# Patient Record
(demographics unavailable — no encounter records)

---

## 2025-06-03 NOTE — HISTORY OF PRESENT ILLNESS
[FreeTextEntry1] : SHARATH is a 14 year old F who presents for evaluation of BL knee pain x 1 month.  She comes in today with her mom.  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  She was doing well recently until she started developing bilateral knee pain over the last month.  She reports the left is worse than the right.  She feels that there is occasional swelling in the front of the knee.  She has pain with walking and jumping.  She has tried ice but has not taken any over-the-counter anti-inflammatories.  She currently exercises at the gym and does leg presses which she reports does not exacerbate the pain.  She is here for orthopaedic evaluation.

## 2025-06-03 NOTE — REASON FOR VISIT
[Follow Up] : a follow up visit [FreeTextEntry1] : BL knee pain [Patient] : patient [Mother] : mother

## 2025-06-03 NOTE — ASSESSMENT
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   Today's visit included obtaining the history from the child and parent, due to the child's age, the child could not be considered a reliable historian, requiring the parent to act as an independent historian. The condition, natural history, and prognosis were explained to the patient and family. The clinical findings and images were reviewed with the family.   BL Knee XRs taken in office on 6/3/25 were viewed and independently interpreted: The patient is skeletally mature. No fracture, or dislocation noted. No bony abnormalities noted. No soft tissue findings.   On physical exam, she has no evidence of knee effusions.  She has mild pain over bilateral quadriceps tendons and bilateral patellar tendons.  Recommendation at this time is for ice, Motrin, activity modification as needed, and at home exercises.  A handout of exercises was provided to the patient today.  I am happy to see DIONI if there are any concerns or anytime a problem arises in the future.   All questions were answered, the family expresses understanding and agrees with the plan of care.   This note was generated using Dragon medical dictation software. A reasonable effort has been made for proofreading its contents, but typos may still remain. If there are any questions or points of clarification needed please do not hesitate to contact my office.

## 2025-06-03 NOTE — PHYSICAL EXAM
[FreeTextEntry1] : BL knee: Skin intact No effusion Painless ROM from full extension 0 to full flexion 140 No crepitus felt with ROM of the knee No J sign mild TTP over quad tendons mild ttp over patellar tendons No pain with patellofemoral compression No patellar apprehension No joint line tenderness Negative mcmurrays Negative lachmans Stable to varus/valgus stress

## 2025-06-03 NOTE — DATA REVIEWED
[de-identified] : BL Knee XRs taken in office on 6/3/25 were viewed and independently interpreted: The patient is skeletally mature. No fracture, or dislocation noted. No bony abnormalities noted. No soft tissue findings.